# Patient Record
Sex: MALE | Race: OTHER | Employment: STUDENT | URBAN - METROPOLITAN AREA
[De-identification: names, ages, dates, MRNs, and addresses within clinical notes are randomized per-mention and may not be internally consistent; named-entity substitution may affect disease eponyms.]

---

## 2017-01-22 ENCOUNTER — HOSPITAL ENCOUNTER (OUTPATIENT)
Age: 22
Discharge: HOME | End: 2017-01-22
Payer: COMMERCIAL

## 2017-01-22 VITALS
TEMPERATURE: 99 F | HEART RATE: 86 BPM | OXYGEN SATURATION: 99 % | SYSTOLIC BLOOD PRESSURE: 125 MMHG | DIASTOLIC BLOOD PRESSURE: 76 MMHG | RESPIRATION RATE: 20 BRPM

## 2017-01-22 DIAGNOSIS — H10.89 OTHER CONJUNCTIVITIS OF BOTH EYES: Primary | ICD-10-CM

## 2017-01-22 DIAGNOSIS — H66.001 ACUTE SUPPURATIVE OTITIS MEDIA OF RIGHT EAR WITHOUT SPONTANEOUS RUPTURE OF TYMPANIC MEMBRANE, RECURRENCE NOT SPECIFIED: ICD-10-CM

## 2017-01-22 PROCEDURE — 99214 OFFICE O/P EST MOD 30 MIN: CPT

## 2017-01-22 PROCEDURE — 99213 OFFICE O/P EST LOW 20 MIN: CPT

## 2017-01-22 RX ORDER — AMOXICILLIN 875 MG/1
875 TABLET, COATED ORAL 2 TIMES DAILY
Qty: 20 TABLET | Refills: 0 | Status: SHIPPED | OUTPATIENT
Start: 2017-01-22 | End: 2017-02-01

## 2017-01-22 RX ORDER — CIPROFLOXACIN HYDROCHLORIDE 3.5 MG/ML
2 SOLUTION/ DROPS TOPICAL
Qty: 1 BOTTLE | Refills: 0 | Status: SHIPPED | OUTPATIENT
Start: 2017-01-22 | End: 2017-02-01

## 2017-01-22 NOTE — ED INITIAL ASSESSMENT (HPI)
Pt presents to the immediate care due to URI and cough symptoms over the last week. Pt c/o right ear fullness and bilateral eye redness - denies discharge.

## 2017-01-22 NOTE — ED PROVIDER NOTES
Patient Seen in: [facility]    History   Patient presents with:  Cough/URI    Stated Complaint: congestion, right ear pain    HPI    59-year-old male who states he has had upper respiratory symptoms for approximately 1 week, he for age   Head: Normocephalic, without obvious abnormality   Eyes: EOMI without nystagmus. PERRLA. Bilateral eyes: mild conjunctival injection. +matting. No obvious foreign body. No surrounding erythema or edema.        Ears: Left TM clear and pearly gray c every 4 hours for 5 days  Qty: 1 Bottle Refills: 0  Associated Diagnoses:Other conjunctivitis of both eyes        I have given the patient instructions regarding his diagnosis, expectations, follow up, and return to the ER precautions.   I explained to the

## 2021-02-25 ENCOUNTER — HOSPITAL ENCOUNTER (OUTPATIENT)
Age: 26
Discharge: HOME | End: 2021-02-25
Payer: COMMERCIAL

## 2021-02-25 VITALS
SYSTOLIC BLOOD PRESSURE: 112 MMHG | HEART RATE: 60 BPM | OXYGEN SATURATION: 97 % | DIASTOLIC BLOOD PRESSURE: 70 MMHG | WEIGHT: 180 LBS | RESPIRATION RATE: 16 BRPM | BODY MASS INDEX: 24.38 KG/M2 | TEMPERATURE: 98 F | HEIGHT: 72 IN

## 2021-02-25 DIAGNOSIS — S00.411A ABRASION OF RIGHT EAR CANAL, INITIAL ENCOUNTER: ICD-10-CM

## 2021-02-25 DIAGNOSIS — H60.501 ACUTE OTITIS EXTERNA OF RIGHT EAR, UNSPECIFIED TYPE: Primary | ICD-10-CM

## 2021-02-25 PROCEDURE — 99203 OFFICE O/P NEW LOW 30 MIN: CPT

## 2021-02-25 RX ORDER — OFLOXACIN 3 MG/ML
5 SOLUTION AURICULAR (OTIC) DAILY
Qty: 1 BOTTLE | Refills: 0 | Status: SHIPPED | OUTPATIENT
Start: 2021-02-25 | End: 2021-03-04

## 2021-02-25 NOTE — ED INITIAL ASSESSMENT (HPI)
The patient states 1-2 months ago he felt like he had swimmers ear and couldn't pop his right ear that has not resolved. He first noticed dry blood to the right ear last week.  His girlfriend noticed blood on his pillow case this morning and when he went to

## 2021-02-25 NOTE — ED PROVIDER NOTES
Patient Seen in: [facility] [place]      History   Patient presents with:  Ear Problem Pain    Stated Complaint: ear bleeding     HPI/Subjective:   HPI  14-year-old male presents to the immediate care complaining of right ear pain and bleeding. abrasion noted to the 6 o'clock position of the ear canal.  Bleeding is controlled. I am able to visualize the tympanic membrane. Pain reported with palpation of pinna and tragus. Cardiovascular:      Rate and Rhythm: Normal rate and regular rhythm.

## 2022-07-19 ENCOUNTER — APPOINTMENT (OUTPATIENT)
Dept: GENERAL RADIOLOGY | Age: 27
End: 2022-07-19
Attending: EMERGENCY MEDICINE
Payer: COMMERCIAL

## 2022-07-19 ENCOUNTER — HOSPITAL ENCOUNTER (OUTPATIENT)
Age: 27
Discharge: HOME | End: 2022-07-19
Attending: EMERGENCY MEDICINE
Payer: COMMERCIAL

## 2022-07-19 VITALS
SYSTOLIC BLOOD PRESSURE: 121 MMHG | RESPIRATION RATE: 17 BRPM | HEIGHT: 72 IN | OXYGEN SATURATION: 98 % | HEART RATE: 66 BPM | BODY MASS INDEX: 25.73 KG/M2 | DIASTOLIC BLOOD PRESSURE: 70 MMHG | TEMPERATURE: 97 F | WEIGHT: 190 LBS

## 2022-07-19 DIAGNOSIS — R07.89 CHEST PAIN, MUSCULOSKELETAL: Primary | ICD-10-CM

## 2022-07-19 LAB
ATRIAL RATE: 72 BPM
P AXIS: 51 DEGREES
P-R INTERVAL: 150 MS
Q-T INTERVAL: 368 MS
QRS DURATION: 86 MS
QTC CALCULATION (BEZET): 402 MS
R AXIS: 71 DEGREES
T AXIS: 72 DEGREES
VENTRICULAR RATE: 72 BPM

## 2022-07-19 PROCEDURE — 99213 OFFICE O/P EST LOW 20 MIN: CPT

## 2022-07-19 PROCEDURE — 93005 ELECTROCARDIOGRAM TRACING: CPT

## 2022-07-19 PROCEDURE — 99214 OFFICE O/P EST MOD 30 MIN: CPT

## 2022-07-19 PROCEDURE — 71046 X-RAY EXAM CHEST 2 VIEWS: CPT | Performed by: EMERGENCY MEDICINE

## 2022-07-19 PROCEDURE — 93010 ELECTROCARDIOGRAM REPORT: CPT

## 2022-07-19 NOTE — ED INITIAL ASSESSMENT (HPI)
Patient presents to IC with c/o chest pressure for the last few weeks which has worsened of late. Woke him up at 2am today with pressure and did experience sob x 15 minutes. Pain is reproduceable. No injury noted. States usually relieved by \"popping\"his chest.

## 2023-03-20 ENCOUNTER — HOSPITAL ENCOUNTER (OUTPATIENT)
Age: 28
Discharge: HOME | End: 2023-03-20
Payer: COMMERCIAL

## 2023-03-20 VITALS
WEIGHT: 193 LBS | SYSTOLIC BLOOD PRESSURE: 125 MMHG | RESPIRATION RATE: 16 BRPM | DIASTOLIC BLOOD PRESSURE: 66 MMHG | OXYGEN SATURATION: 96 % | BODY MASS INDEX: 26.14 KG/M2 | HEART RATE: 74 BPM | TEMPERATURE: 98 F | HEIGHT: 72 IN

## 2023-03-20 DIAGNOSIS — K64.5 THROMBOSED HEMORRHOIDS: Primary | ICD-10-CM

## 2023-03-20 DIAGNOSIS — L03.90 CELLULITIS, UNSPECIFIED CELLULITIS SITE: ICD-10-CM

## 2023-03-20 PROCEDURE — 99213 OFFICE O/P EST LOW 20 MIN: CPT

## 2023-03-20 PROCEDURE — 99214 OFFICE O/P EST MOD 30 MIN: CPT

## 2023-03-20 RX ORDER — CEPHALEXIN 500 MG/1
500 CAPSULE ORAL 3 TIMES DAILY
Qty: 30 CAPSULE | Refills: 0 | Status: SHIPPED | OUTPATIENT
Start: 2023-03-20 | End: 2023-03-30

## 2023-03-20 RX ORDER — HYDROCORTISONE ACETATE 25 MG/1
25 SUPPOSITORY RECTAL 2 TIMES DAILY PRN
Qty: 12 SUPPOSITORY | Refills: 0 | Status: SHIPPED | OUTPATIENT
Start: 2023-03-20 | End: 2023-04-19

## 2023-03-20 NOTE — DISCHARGE INSTRUCTIONS
Apply the mupirocin to your right ear. Take the cephalexin as directed. Use the hydrocortisone and the lidocaine to your rectal area. Close follow-up with primary care doctor which was provided as well as the surgery group.

## 2023-03-20 NOTE — ED INITIAL ASSESSMENT (HPI)
Patient states he is here for evaluation of right ear pain and dry skin x 2 months and for evaluation of a hemorrhoid x 2 weeks. States 2 weeks ago he had a lot of straining and constipation with a BM, causing the hemorrhoid. States he has recently noticed a foul odor and blood from the hemorrhoid. Denies any fevers, chills, or other symptoms.

## (undated) NOTE — ED AVS SNAPSHOT
[facility] in [address]    Phone:  [phone]    Fax:  [phone]           Mr. Ti Ohara   MRN: [id number]    Department:  [facility]   Date of Visit:  1/22/2017 medications prescribed to you as instructed and complete any antibiotic prescription you begin.      Warm compresses to both eyes    Discharge References/Attachments     CONJUNCTIVITIS, NON-SPECIFIC (ENGLISH)    OTITIS MEDIA, ANTIBIOTIC TREATMENT (ADULT) (E care or specialist physician will see patients referred from the [facility]. Follow-up care is at the discretion of that Physician.     IF THERE IS ANY CHANGE OR WORSENING OF YOUR CONDITION, CALL YOUR PRIMARY CARE PHYSICIAN AT ONCE OR GO TO THE harming yourself, contact [facility] at [phone]. - If you don’t have insurance, [facility] has partnered with Patient [address] to help you get signed up for insurance coverage.   Patient [facility]